# Patient Record
Sex: FEMALE | Race: WHITE | NOT HISPANIC OR LATINO | Employment: FULL TIME | URBAN - METROPOLITAN AREA
[De-identification: names, ages, dates, MRNs, and addresses within clinical notes are randomized per-mention and may not be internally consistent; named-entity substitution may affect disease eponyms.]

---

## 2020-03-01 ENCOUNTER — HOSPITAL ENCOUNTER (EMERGENCY)
Facility: HOSPITAL | Age: 50
Discharge: HOME | End: 2020-03-02
Attending: EMERGENCY MEDICINE | Admitting: EMERGENCY MEDICINE
Payer: COMMERCIAL

## 2020-03-01 DIAGNOSIS — R07.9 CHEST PAIN: Primary | ICD-10-CM

## 2020-03-01 LAB
ALBUMIN SERPL BCP-MCNC: 4.8 G/DL (ref 3.5–5.7)
ALP SERPL-CCNC: 57 U/L (ref 40–150)
ALT SERPL W P-5'-P-CCNC: 19 U/L (ref 7–52)
ANION GAP SERPL CALCULATED.3IONS-SCNC: 6 MMOL/L (ref 4–13)
APTT PPP: 40 SECONDS (ref 23–37)
AST SERPL W P-5'-P-CCNC: 16 U/L (ref 13–39)
BASOPHILS # BLD AUTO: 0.1 THOUSANDS/ΜL (ref 0–0.1)
BASOPHILS NFR BLD AUTO: 1 % (ref 0–2)
BILIRUB SERPL-MCNC: 0.3 MG/DL (ref 0.2–1)
BUN SERPL-MCNC: 19 MG/DL (ref 7–25)
CALCIUM SERPL-MCNC: 9.4 MG/DL (ref 8.6–10.5)
CHLORIDE SERPL-SCNC: 104 MMOL/L (ref 98–107)
CO2 SERPL-SCNC: 29 MMOL/L (ref 21–31)
CREAT SERPL-MCNC: 0.96 MG/DL (ref 0.6–1.2)
D DIMER PPP FEU-MCNC: <0.15 UG/ML FEU
EOSINOPHIL # BLD AUTO: 0.2 THOUSAND/ΜL (ref 0–0.61)
EOSINOPHIL NFR BLD AUTO: 3 % (ref 0–5)
ERYTHROCYTE [DISTWIDTH] IN BLOOD BY AUTOMATED COUNT: 12.9 % (ref 11.5–14.5)
GFR SERPL CREATININE-BSD FRML MDRD: 69 ML/MIN/1.73SQ M
GLUCOSE SERPL-MCNC: 99 MG/DL (ref 65–99)
HCT VFR BLD AUTO: 40.8 % (ref 42–47)
HGB BLD-MCNC: 13.8 G/DL (ref 12–16)
INR PPP: 1.1 (ref 0.9–1.5)
LYMPHOCYTES # BLD AUTO: 2.6 THOUSANDS/ΜL (ref 0.6–4.47)
LYMPHOCYTES NFR BLD AUTO: 40 % (ref 21–51)
MCH RBC QN AUTO: 30.6 PG (ref 26–34)
MCHC RBC AUTO-ENTMCNC: 33.8 G/DL (ref 31–37)
MCV RBC AUTO: 91 FL (ref 81–99)
MONOCYTES # BLD AUTO: 0.6 THOUSAND/ΜL (ref 0.17–1.22)
MONOCYTES NFR BLD AUTO: 9 % (ref 2–12)
NEUTROPHILS # BLD AUTO: 3 THOUSANDS/ΜL (ref 1.4–6.5)
NEUTS SEG NFR BLD AUTO: 47 % (ref 42–75)
PLATELET # BLD AUTO: 239 THOUSANDS/UL (ref 149–390)
PMV BLD AUTO: 8 FL (ref 8.6–11.7)
POTASSIUM SERPL-SCNC: 3.6 MMOL/L (ref 3.5–5.5)
PROT SERPL-MCNC: 6.9 G/DL (ref 6.4–8.9)
PROTHROMBIN TIME: 12.7 SECONDS (ref 10.2–13)
RBC # BLD AUTO: 4.5 MILLION/UL (ref 3.9–5.2)
SODIUM SERPL-SCNC: 139 MMOL/L (ref 134–143)
TROPONIN I SERPL-MCNC: <0.03 NG/ML
WBC # BLD AUTO: 6.4 THOUSAND/UL (ref 4.8–10.8)

## 2020-03-01 PROCEDURE — 93005 ELECTROCARDIOGRAM TRACING: CPT

## 2020-03-01 PROCEDURE — 99284 EMERGENCY DEPT VISIT MOD MDM: CPT | Performed by: EMERGENCY MEDICINE

## 2020-03-01 PROCEDURE — 85025 COMPLETE CBC W/AUTO DIFF WBC: CPT | Performed by: EMERGENCY MEDICINE

## 2020-03-01 PROCEDURE — 99285 EMERGENCY DEPT VISIT HI MDM: CPT

## 2020-03-01 PROCEDURE — 80053 COMPREHEN METABOLIC PANEL: CPT | Performed by: EMERGENCY MEDICINE

## 2020-03-01 PROCEDURE — 36415 COLL VENOUS BLD VENIPUNCTURE: CPT | Performed by: EMERGENCY MEDICINE

## 2020-03-01 PROCEDURE — 85610 PROTHROMBIN TIME: CPT | Performed by: EMERGENCY MEDICINE

## 2020-03-01 PROCEDURE — 85730 THROMBOPLASTIN TIME PARTIAL: CPT | Performed by: EMERGENCY MEDICINE

## 2020-03-01 PROCEDURE — 84484 ASSAY OF TROPONIN QUANT: CPT | Performed by: EMERGENCY MEDICINE

## 2020-03-01 PROCEDURE — 85379 FIBRIN DEGRADATION QUANT: CPT | Performed by: EMERGENCY MEDICINE

## 2020-03-02 VITALS
DIASTOLIC BLOOD PRESSURE: 69 MMHG | SYSTOLIC BLOOD PRESSURE: 119 MMHG | WEIGHT: 135 LBS | HEIGHT: 64 IN | TEMPERATURE: 97.8 F | OXYGEN SATURATION: 99 % | RESPIRATION RATE: 16 BRPM | BODY MASS INDEX: 23.05 KG/M2 | HEART RATE: 59 BPM

## 2020-03-02 LAB — TROPONIN I SERPL-MCNC: <0.03 NG/ML

## 2020-03-03 LAB
ATRIAL RATE: 59 BPM
ATRIAL RATE: 63 BPM
P AXIS: 25 DEGREES
P AXIS: 65 DEGREES
PR INTERVAL: 170 MS
PR INTERVAL: 172 MS
QRS AXIS: 75 DEGREES
QRS AXIS: 80 DEGREES
QRSD INTERVAL: 78 MS
QRSD INTERVAL: 82 MS
QT INTERVAL: 378 MS
QT INTERVAL: 394 MS
QTC INTERVAL: 386 MS
QTC INTERVAL: 390 MS
T WAVE AXIS: 54 DEGREES
T WAVE AXIS: 71 DEGREES
VENTRICULAR RATE: 59 BPM
VENTRICULAR RATE: 63 BPM

## 2020-03-03 PROCEDURE — 93010 ELECTROCARDIOGRAM REPORT: CPT | Performed by: INTERNAL MEDICINE

## 2020-03-03 NOTE — ED PROVIDER NOTES
History  Chief Complaint   Patient presents with    Chest Pain     chest pain radiating into back for 3 days  States worse with movement  Pain is constant, describes as heavy  No shortness of breath  HPI     Patient is a pleasant 44year old female who presents with constant substernal non-radiating chest pain for the past 3 days  No radiation to the back  No tearing pain going to the back  Normal bilat UE pulses  No pleuritic pain  No history of PE  No new unilateral leg swelling  Non exertional  No sweats  No right sided pain  No vomiting  No fever or cough to suggest pneumonia  No  vomiting or subcue crepitus  Equal breath sounds  No skin rash  Patient appears well  Patient is low-risk for ACS by the HEART score and has approx 1 7-2 5% risk of MACE in the next 30d given the negative EKG and initial cardiac troponin  I discussed with the patient the option of obtaining a repeat cardiac troponin and EKG at the t+3 hr time point  Patient was agreeable to repeat troponin/ekg at t+3 hr meredith  If serial troponin is negative patient understands the need to follow up w PCP as soon as possible for further evaluation for additional investigation; ED return precautions regarding chest pain symptoms concerning for ACS were discussed (e g , dyspnea, exertional pain, radiation of pain to shoulders, diaphoresis, vomiting)  Patient expressed understanding and agreed to plan  MDM well appearing 44year old female chest pain, will delta trop, followup for stress test      Ddimer negative, not a pe  Discussed with the patient who agrees with the workup and plan, understands return precautions and followup instructions  None       History reviewed  No pertinent past medical history  History reviewed  No pertinent surgical history  History reviewed  No pertinent family history  I have reviewed and agree with the history as documented      E-Cigarette/Vaping    E-Cigarette Use Never User      E-Cigarette/Vaping Substances     Social History     Tobacco Use    Smoking status: Never Smoker    Smokeless tobacco: Never Used   Substance Use Topics    Alcohol use: Yes     Frequency: Monthly or less    Drug use: Never       Review of Systems   Cardiovascular: Positive for chest pain  All other systems reviewed and are negative  Physical Exam  Physical Exam   Constitutional: She is oriented to person, place, and time  She appears well-developed and well-nourished  HENT:   Head: Normocephalic and atraumatic  Right Ear: External ear normal    Left Ear: External ear normal    Eyes: Conjunctivae and EOM are normal    Neck: Normal range of motion  Neck supple  No JVD present  No tracheal deviation present  Cardiovascular: Normal rate, regular rhythm and normal heart sounds  Pulmonary/Chest: Effort normal  No respiratory distress  She has no wheezes  She has no rales  Abdominal: Soft  Bowel sounds are normal  There is no tenderness  There is no rebound and no guarding  Musculoskeletal: She exhibits no edema or tenderness  Neurological: She is alert and oriented to person, place, and time  Skin: Skin is warm and dry  No rash noted  No erythema  Psychiatric: She has a normal mood and affect  Thought content normal    Nursing note and vitals reviewed        Vital Signs  ED Triage Vitals   Temperature Pulse Respirations Blood Pressure SpO2   03/01/20 2049 03/01/20 2047 03/01/20 2100 03/01/20 2047 03/01/20 2047   97 8 °F (36 6 °C) 70 19 169/92 100 %      Temp src Heart Rate Source Patient Position - Orthostatic VS BP Location FiO2 (%)   -- 03/01/20 2100 -- -- --    Monitor         Pain Score       03/01/20 2047       3           Vitals:    03/02/20 0015 03/02/20 0030 03/02/20 0045 03/02/20 0100   BP:  123/74  119/69   Pulse: 59 56 64 59         Visual Acuity      ED Medications  Medications - No data to display    Diagnostic Studies  Results Reviewed     Procedure Component Value Units Date/Time    Troponin I [[id number]]  (Normal) Collected:  03/01/20 2356    Lab Status:  Final result Specimen:  Blood from Arm, Right Updated:  03/02/20 0022     Troponin I <0 03 ng/mL     Troponin I [[id number]]  (Normal) Collected:  03/01/20 2056    Lab Status:  Final result Specimen:  Blood from Arm, Right Updated:  03/01/20 2125     Troponin I <0 03 ng/mL     Comprehensive metabolic panel [[id number]] Collected:  03/01/20 2056    Lab Status:  Final result Specimen:  Blood from Arm, Right Updated:  03/01/20 2125     Sodium 139 mmol/L      Potassium 3 6 mmol/L      Chloride 104 mmol/L      CO2 29 mmol/L      ANION GAP 6 mmol/L      BUN 19 mg/dL      Creatinine 0 96 mg/dL      Glucose 99 mg/dL      Calcium 9 4 mg/dL      AST 16 U/L      ALT 19 U/L      Alkaline Phosphatase 57 U/L      Total Protein 6 9 g/dL      Albumin 4 8 g/dL      Total Bilirubin 0 30 mg/dL      eGFR 69 ml/min/1 73sq m     Narrative:       [facility] guidelines for Chronic Kidney Disease (CKD):     Stage 1 with normal or high GFR (GFR > 90 mL/min/1 73 square meters)    Stage 2 Mild CKD (GFR = 60-89 mL/min/1 73 square meters)    Stage 3A Moderate CKD (GFR = 45-59 mL/min/1 73 square meters)    Stage 3B Moderate CKD (GFR = 30-44 mL/min/1 73 square meters)    Stage 4 Severe CKD (GFR = 15-29 mL/min/1 73 square meters)    Stage 5 End Stage CKD (GFR <15 mL/min/1 73 square meters)  Note: GFR calculation is accurate only with a steady state creatinine    Protime-INR [[id number]]  (Normal) Collected:  03/01/20 2056    Lab Status:  Final result Specimen:  Blood from Arm, Right Updated:  03/01/20 2125     Protime 12 7 seconds      INR 1 10    APTT [[id number]]  (Abnormal) Collected:  03/01/20 2056    Lab Status:  Final result Specimen:  Blood from Arm, Right Updated:  03/01/20 2125     PTT 40 seconds     D-Dimer [[id number]]  (Normal) Collected:  03/01/20 2056    Lab Status:  Final result Specimen:  Blood from Arm, Right Updated: 03/01/20 2125     D-Dimer, Quant <0 15 ug/ml FEU     CBC and differential [[id number]]  (Abnormal) Collected:  03/01/20 2056    Lab Status:  Final result Specimen:  Blood from Arm, Right Updated:  03/01/20 2115     WBC 6 40 Thousand/uL      RBC 4 50 Million/uL      Hemoglobin 13 8 g/dL      Hematocrit 40 8 %      MCV 91 fL      MCH 30 6 pg      MCHC 33 8 g/dL      RDW 12 9 %      MPV 8 0 fL      Platelets 286 Thousands/uL      Neutrophils Relative 47 %      Lymphocytes Relative 40 %      Monocytes Relative 9 %      Eosinophils Relative 3 %      Basophils Relative 1 %      Neutrophils Absolute 3 00 Thousands/µL      Lymphocytes Absolute 2 60 Thousands/µL      Monocytes Absolute 0 60 Thousand/µL      Eosinophils Absolute 0 20 Thousand/µL      Basophils Absolute 0 10 Thousands/µL                  No orders to display              Procedures  Procedures         ED Course         HEART Risk Score      Most Recent Value   Heart Score Risk Calculator   History  0 Filed at: 03/02/2020 0037   ECG  0 Filed at: 03/02/2020 0037   Age  1 Filed at: 03/02/2020 0037   Risk Factors  1 Filed at: 03/02/2020 0037   Troponin  0 Filed at: 03/02/2020 0037   HEART Score  2 Filed at: 03/02/2020 4212                            MDM      Disposition  Final diagnoses:   Chest pain     Time reflects when diagnosis was documented in both MDM as applicable and the Disposition within this note     Time User Action Codes Description Comment    3/2/2020 12:36 AM Pat Harden, [address] [R07 9] Chest pain       ED Disposition     ED Disposition Condition Date/Time Comment    Discharge Stable Mon Mar 2, 2020 12:36 AM Pax Renetta discharge to home/self care  Follow-up Information     Follow up With Specialties Details Why Contact Info    Primary Care Doctor  Schedule an appointment as soon as possible for a visit in 1 day            There are no discharge medications for this patient      Outpatient Discharge Orders   Echo stress test w contrast if indicated   Standing Status: Future Standing Exp   Date: 03/02/24       PDMP Review     None          ED Provider  Electronically Signed by           Marielos Hendrickson MD  03/03/20 2229

## 2020-12-24 DIAGNOSIS — Z20.822 EXPOSURE TO COVID-19 VIRUS: Primary | ICD-10-CM

## 2020-12-24 DIAGNOSIS — Z20.822 EXPOSURE TO COVID-19 VIRUS: ICD-10-CM

## 2020-12-24 PROCEDURE — U0003 INFECTIOUS AGENT DETECTION BY NUCLEIC ACID (DNA OR RNA); SEVERE ACUTE RESPIRATORY SYNDROME CORONAVIRUS 2 (SARS-COV-2) (CORONAVIRUS DISEASE [COVID-19]), AMPLIFIED PROBE TECHNIQUE, MAKING USE OF HIGH THROUGHPUT TECHNOLOGIES AS DESCRIBED BY CMS-2020-01-R: HCPCS | Performed by: FAMILY MEDICINE

## 2020-12-27 LAB — SARS-COV-2 RNA SPEC QL NAA+PROBE: DETECTED

## 2020-12-29 ENCOUNTER — TELEMEDICINE (OUTPATIENT)
Dept: FAMILY MEDICINE CLINIC | Facility: CLINIC | Age: 50
End: 2020-12-29
Payer: COMMERCIAL

## 2020-12-29 VITALS — WEIGHT: 151 LBS | HEIGHT: 64 IN | TEMPERATURE: 98.4 F | BODY MASS INDEX: 25.78 KG/M2

## 2020-12-29 DIAGNOSIS — U07.1 COVID-19 VIRUS INFECTION: Primary | ICD-10-CM

## 2020-12-29 PROCEDURE — 1036F TOBACCO NON-USER: CPT | Performed by: FAMILY MEDICINE

## 2020-12-29 PROCEDURE — 3725F SCREEN DEPRESSION PERFORMED: CPT | Performed by: FAMILY MEDICINE

## 2020-12-29 PROCEDURE — 99213 OFFICE O/P EST LOW 20 MIN: CPT | Performed by: FAMILY MEDICINE

## 2020-12-30 ENCOUNTER — TELEMEDICINE (OUTPATIENT)
Dept: FAMILY MEDICINE CLINIC | Facility: CLINIC | Age: 50
End: 2020-12-30
Payer: COMMERCIAL

## 2020-12-30 VITALS — HEIGHT: 64 IN | TEMPERATURE: 97.7 F | WEIGHT: 150.4 LBS | BODY MASS INDEX: 25.68 KG/M2

## 2020-12-30 DIAGNOSIS — U07.1 COVID-19 VIRUS INFECTION: Primary | ICD-10-CM

## 2020-12-30 PROCEDURE — 99213 OFFICE O/P EST LOW 20 MIN: CPT | Performed by: FAMILY MEDICINE

## 2020-12-31 ENCOUNTER — TELEMEDICINE (OUTPATIENT)
Dept: FAMILY MEDICINE CLINIC | Facility: CLINIC | Age: 50
End: 2020-12-31
Payer: COMMERCIAL

## 2020-12-31 VITALS — HEIGHT: 64 IN | BODY MASS INDEX: 25.47 KG/M2 | TEMPERATURE: 98.4 F | WEIGHT: 149.2 LBS

## 2020-12-31 DIAGNOSIS — U07.1 COVID-19 VIRUS INFECTION: Primary | ICD-10-CM

## 2020-12-31 PROCEDURE — 3008F BODY MASS INDEX DOCD: CPT | Performed by: FAMILY MEDICINE

## 2020-12-31 PROCEDURE — 99213 OFFICE O/P EST LOW 20 MIN: CPT | Performed by: FAMILY MEDICINE

## 2021-01-04 ENCOUNTER — TELEMEDICINE (OUTPATIENT)
Dept: FAMILY MEDICINE CLINIC | Facility: CLINIC | Age: 51
End: 2021-01-04
Payer: COMMERCIAL

## 2021-01-04 VITALS — WEIGHT: 149 LBS | HEIGHT: 64 IN | BODY MASS INDEX: 25.44 KG/M2 | TEMPERATURE: 97.8 F

## 2021-01-04 DIAGNOSIS — U07.1 COVID-19 VIRUS INFECTION: Primary | ICD-10-CM

## 2021-01-04 PROCEDURE — 1036F TOBACCO NON-USER: CPT | Performed by: FAMILY MEDICINE

## 2021-01-04 PROCEDURE — 3008F BODY MASS INDEX DOCD: CPT | Performed by: FAMILY MEDICINE

## 2021-01-04 PROCEDURE — 99213 OFFICE O/P EST LOW 20 MIN: CPT | Performed by: FAMILY MEDICINE

## 2021-01-04 NOTE — LETTER
Dear Dr Pollo Burgos is interested in participating in the [facility] COVID-19 convalescent plasma collection program  Please see my attached note verifying eligibility  Sincerely,      Luca DO Cele    [facility] 44 y o  female   MRN: [id number]  1970    COVID-19 Convalescent Plasma Donor Attestation Verification of Eligibility     [facility] is a 44 y o  female who was diagnosed with COVID-19 infection, has recovered from the illness and wishes to participate in the [facility] convalescent plasma donation program to treat critically ill COVID-19 patients  Donor understands that they will be screened by [address] and if deemed a suitable candidate, donation appointment time will be arranged directly through [address]  They were also informed that their plasma will be in a donor pool and cannot be directed to a specific patient  Patient tested positive for 2019 Novel Coronavirus 2019 (SARS-CoV-2 RNA) on 12/24/2020  Patient is now recovered from COVID-19 and has been or will be symptom free for at least 28 days, effective 02/01/2021  To facilitate donation, eligibility form sent directly to [address]  Patients preferred contact number: [phone]    All of the patient's questions were answered via telephone  She is aware to call our office with any further questions or concerns as needed

## 2021-01-04 NOTE — PROGRESS NOTES
COVID-19 Virtual Visit     Assessment/Plan:    Problem List Items Addressed This Visit        Other    COVID-19 virus infection - Primary     Today is day #10  The patient has improved  I am going to take her out of quarantine tomorrow in discharge her  I told her that she will no longer be considered infectious after 10 days in quarantine  She is also considered immune  I told her that immunity is likely short-lived  As such, she still needs to wear mask, wash her hands frequently, and practice social distancing  I told her that if she is in contact with another COVID-19 positive individual within the next 90 days, she does not need to quarantine and she does not need to be retested  I also advised her that she is still candidate to get the vaccination  I recommend she wait 90 days prior to getting the vaccine  Lastly, we discussed plasma donation  The patient was interested in plasma donation  I will submit a letter to the [facility] blood back on her behalf  I answered all of her questions  The patient was discharged  Disposition:     I recommended continued isolation until at least 24 hours have passed since recovery defined as resolution of fever without the use of fever-reducing medications and improvement in respiratory symptoms (e g , cough, shortness of breath) AND 10 days have passed since onset of symptoms  I have spent 14 minutes directly with the patient  Greater than 50% of this time was spent in counseling/coordination of care regarding: prognosis and patient and family education          Encounter provider Shasha Pablo DO    Provider located at [facility]  [address] [address]  [phone]    Recent Visits  Date Type Provider Dept   12/31/20 [address], [facility]   12/30/20 [address], DO [facility]   12/29/20 Telemedicine Beau Lord Khang, DO [facility]   Showing recent visits within past 7 days and meeting all other requirements     Today's Visits  Date Type Provider Dept   01/04/21 Telemedicine Luca Oakley, DO [facility]   Showing today's visits and meeting all other requirements     Future Appointments  No visits were found meeting these conditions  Showing future appointments within next 150 days and meeting all other requirements      This virtual check-in was done via [employer] and patient was informed that this is not a secure, HIPAA-compliant platform  She agrees to proceed  Patient agrees to participate in a virtual check in via telephone or video visit instead of presenting to the office to address urgent/immediate medical needs  Patient is aware this is a billable service  After connecting through [facility] and the [facility], the patient was identified by name and date of birth  Rodri Lancaster was informed that this was a telemedicine visit and that the exam was being conducted confidentially over secure lines  My office door was closed  No one else was in the room  Rodri Lancaster acknowledged consent and understanding of privacy and security of the telemedicine visit  I informed the patient that I have reviewed her record in Epic and presented the opportunity for her to ask any questions regarding the visit today  The patient agreed to participate  Subjective:   Rodri Lancaster is a 44 y o  female who has been screened for COVID-19  Symptom change since last report: resolving  Date of symptom onset: 12/25/2020    Patient's symptoms include nasal congestion, sore throat, anosmia, loss of taste and cough  Patient denies fever, chills, fatigue, malaise, rhinorrhea, shortness of breath, chest tightness, abdominal pain, nausea, vomiting, diarrhea, myalgias and headaches  Fabrice Smalls has been staying home and has isolated themselves in her home   She is taking care to not share personal items and is cleaning all surfaces that are touched often, like counters, tabletops, and doorknobs using household cleaning sprays or wipes  Wearing a mask when leaving room?: is not      Lab Results   Component Value Date    SARSCOV2 Detected (A) 12/24/2020     History reviewed  No pertinent past medical history  Past Surgical History:   Procedure Laterality Date    TONSILLECTOMY      TUBAL LIGATION       No current outpatient medications on file  No current facility-administered medications for this visit  No Known Allergies    Review of Systems   Constitutional: Negative for chills, fatigue and fever  HENT: Positive for congestion and sore throat  Negative for rhinorrhea  Respiratory: Positive for cough  Negative for chest tightness and shortness of breath  Gastrointestinal: Negative for abdominal pain, diarrhea, nausea and vomiting  Musculoskeletal: Negative for myalgias  Neurological: Negative for headaches  Objective:    Vitals:    01/04/21 0825   Temp: 97 8 °F (36 6 °C)   Weight: 67 6 kg (149 lb)   Height: 5' 4" (1 626 m)       Physical Exam  Vitals signs reviewed  Constitutional:       Comments: This is a pleasant 41-year-old white male her stated age  She was in no apparent distress   HENT:      Head: Normocephalic and atraumatic  Mouth/Throat:      Mouth: Mucous membranes are moist       Pharynx: Oropharynx is clear  No oropharyngeal exudate or posterior oropharyngeal erythema  Eyes:      General: No scleral icterus  Right eye: No discharge  Left eye: No discharge  Conjunctiva/sclera: Conjunctivae normal    Pulmonary:      Comments: There was no coughing  Patient was not tachypneic  She did not appear to be in any respiratory distress  Lymphadenopathy:      Cervical: No cervical adenopathy  VIRTUAL VISIT DISCLAIMER    Merlin Rosio acknowledges that she has consented to an online visit or consultation   She understands that the online visit is based solely on information provided by her, and that, in the absence of a face-to-face physical evaluation by the physician, the diagnosis she receives is both limited and provisional in terms of accuracy and completeness  This is not intended to replace a full medical face-to-face evaluation by the physician  Estefani Lerma understands and accepts these terms

## 2021-01-04 NOTE — ASSESSMENT & PLAN NOTE
Today is day #10  The patient has improved  I am going to take her out of quarantine tomorrow in discharge her  I told her that she will no longer be considered infectious after 10 days in quarantine  She is also considered immune  I told her that immunity is likely short-lived  As such, she still needs to wear mask, wash her hands frequently, and practice social distancing  I told her that if she is in contact with another COVID-19 positive individual within the next 90 days, she does not need to quarantine and she does not need to be retested  I also advised her that she is still candidate to get the vaccination  I recommend she wait 90 days prior to getting the vaccine  Lastly, we discussed plasma donation  The patient was interested in plasma donation  I will submit a letter to the [facility] blood back on her behalf  I answered all of her questions  The patient was discharged

## 2021-05-04 ENCOUNTER — VBI (OUTPATIENT)
Dept: ADMINISTRATIVE | Facility: OTHER | Age: 51
End: 2021-05-04

## 2021-06-05 ENCOUNTER — RA CDI HCC (OUTPATIENT)
Dept: OTHER | Facility: HOSPITAL | Age: 51
End: 2021-06-05

## 2021-06-05 NOTE — PROGRESS NOTES
[facility]  coding opportunities          Chart reviewed, no opportunity found: CHART REVIEWED, NO OPPORTUNITY FOUND              Patients insurance company:  [facility] (Medicare Advantage and Commercial)

## 2021-06-11 ENCOUNTER — OFFICE VISIT (OUTPATIENT)
Dept: FAMILY MEDICINE CLINIC | Facility: CLINIC | Age: 51
End: 2021-06-11
Payer: COMMERCIAL

## 2021-06-11 VITALS
BODY MASS INDEX: 25.66 KG/M2 | HEIGHT: 64 IN | WEIGHT: 150.3 LBS | HEART RATE: 61 BPM | DIASTOLIC BLOOD PRESSURE: 82 MMHG | TEMPERATURE: 97 F | SYSTOLIC BLOOD PRESSURE: 138 MMHG | OXYGEN SATURATION: 98 %

## 2021-06-11 DIAGNOSIS — Z12.11 COLON CANCER SCREENING: ICD-10-CM

## 2021-06-11 DIAGNOSIS — Z12.31 ENCOUNTER FOR SCREENING MAMMOGRAM FOR MALIGNANT NEOPLASM OF BREAST: ICD-10-CM

## 2021-06-11 DIAGNOSIS — E78.5 DYSLIPIDEMIA: ICD-10-CM

## 2021-06-11 DIAGNOSIS — Z00.00 ANNUAL PHYSICAL EXAM: Primary | ICD-10-CM

## 2021-06-11 DIAGNOSIS — E04.9 GOITER: ICD-10-CM

## 2021-06-11 PROCEDURE — 99396 PREV VISIT EST AGE 40-64: CPT | Performed by: FAMILY MEDICINE

## 2021-06-11 PROCEDURE — 3008F BODY MASS INDEX DOCD: CPT | Performed by: FAMILY MEDICINE

## 2021-06-11 PROCEDURE — 1036F TOBACCO NON-USER: CPT | Performed by: FAMILY MEDICINE

## 2021-06-11 NOTE — ASSESSMENT & PLAN NOTE
I reviewed the patient's family history  Her mother is alive and well  Her father  of unknown causes to the patient  She has 2 brothers  One brother is alive and well  One brother has cancer  She thinks his throat cancer  He apparently was a heavy smoker  She has 1 sister was alive and well  I have recommended routine annual mammogram for the patient  She was agreeable and an order was placed  I recommended colon cancer screening for the patient  We discussed a screening colonoscopy  She elected to do a Cologuard  I will make arrangements to have this sent out to her  I am recommending routine blood work  She should have a lipid panel and her blood sugar checked  Because her thyroid is a little enlarged, I am going to check a TSH as well  I will make further recommendations when I get these results  I completed her form for work  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

## 2021-06-11 NOTE — PROGRESS NOTES
Assessment/Plan:    Annual physical exam   I reviewed the patient's family history  Her mother is alive and well  Her father  of unknown causes to the patient  She has 2 brothers  One brother is alive and well  One brother has cancer  She thinks his throat cancer  He apparently was a heavy smoker  She has 1 sister was alive and well  I have recommended routine annual mammogram for the patient  She was agreeable and an order was placed  I recommended colon cancer screening for the patient  We discussed a screening colonoscopy  She elected to do a Cologuard  I will make arrangements to have this sent out to her  I am recommending routine blood work  She should have a lipid panel and her blood sugar checked  Because her thyroid is a little enlarged, I am going to check a TSH as well  I will make further recommendations when I get these results  I completed her form for work  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  Diagnoses and all orders for this visit:    Annual physical exam    Encounter for screening mammogram for malignant neoplasm of breast  -     Mammo screening bilateral w 3d & cad; Future    Colon cancer screening  -     Cologuard; Future    Goiter  -     TSH, 3rd generation with Free T4 reflex; Future    Dyslipidemia  -     Lipid panel; Future  -     Comprehensive metabolic panel; Future        BMI Counseling: Body mass index is 25 8 kg/m²  The BMI is above normal  Nutrition recommendations include decreasing overall calorie intake  Exercise recommendations include exercising 3-5 times per week  Subjective:      Patient ID: German Sorensen is a 46 y o  female  This patient is a 47-year-old white female who presents to the office today because she needed a work physical done  She has not been seen in this office for years    I did treat this patient for COVID- 19 infection back in December  She had told me at that time that she had not seen a doctor in years  She has never had a mammogram   She tells me she had a colonoscopy at least 20 years ago  Apparently, she was having some type of symptoms at that time which prompted the colonoscopy  Has not had any routine  Colon cancer screening  Did see a gynecologist about 3 years ago  She tells me she feels well and think she is doing well for her age  The following portions of the patient's history were reviewed and updated as appropriate: allergies, current medications, past family history, past medical history, past social history, past surgical history and problem list     Review of Systems   Constitutional: Negative for activity change, appetite change and unexpected weight change  Respiratory: Negative for cough, shortness of breath and wheezing  Cardiovascular: Negative for chest pain, palpitations and leg swelling  Gastrointestinal: Negative for abdominal distention, abdominal pain, blood in stool, constipation and diarrhea  Objective:      /82 (BP Location: Left arm, Patient Position: Sitting, Cuff Size: Adult)   Pulse 61   Temp (!) 97 °F (36 1 °C) (Temporal)   Ht 5' 4" (1 626 m)   Wt 68 2 kg (150 lb 4 8 oz)   SpO2 98%   BMI 25 80 kg/m²          Physical Exam  Vitals signs reviewed  Constitutional:       Comments: This is a 58-year-old white female who appears her stated age  She is in no apparent distress   HENT:      Head: Normocephalic and atraumatic  Right Ear: Tympanic membrane, ear canal and external ear normal  There is no impacted cerumen  Left Ear: Tympanic membrane, ear canal and external ear normal  There is no impacted cerumen  Mouth/Throat:      Mouth: Mucous membranes are moist       Pharynx: Oropharynx is clear  No oropharyngeal exudate or posterior oropharyngeal erythema  Eyes:      General: No scleral icterus  Right eye: No discharge  Left eye: No discharge  Conjunctiva/sclera: Conjunctivae normal       Pupils: Pupils are equal, round, and reactive to light  Neck:      Musculoskeletal: Neck supple  Comments: There was mild symmetrical enlargement of the thyroid gland  There were no nodules palpable  The thyroid was nontender  Cardiovascular:      Rate and Rhythm: Normal rate and regular rhythm  Heart sounds: Normal heart sounds  No murmur  No friction rub  No gallop  Pulmonary:      Effort: Pulmonary effort is normal  No respiratory distress  Breath sounds: Normal breath sounds  No stridor  No wheezing, rhonchi or rales  Abdominal:      General: Bowel sounds are normal  There is no distension  Palpations: Abdomen is soft  There is no mass  Tenderness: There is no abdominal tenderness  There is no guarding  Hernia: No hernia is present  Comments: There is no organomegaly   Lymphadenopathy:      Cervical: No cervical adenopathy  Psychiatric:         Mood and Affect: Mood normal          Behavior: Behavior normal          Thought Content:  Thought content normal          Judgment: Judgment normal        Extremities:  Without cyanosis, clubbing, or edema

## 2021-06-11 NOTE — PATIENT INSTRUCTIONS
Screening for Colorectal Cancer   AMBULATORY CARE:   Screening for colorectal cancer  can find problems early and treatment can begin  Colorectal cancer is the third leading cause of death in the [place] and [place] ([facility])  Early treatment can save your life  It is recommended that you be screened between ages 48and 76years old  Your healthcare provider will tell you if you should be screened before 48 or after 76years of age  Types of available screening: Your healthcare provider will talk to you about the benefits and risks of each type of screening  He or she will work with you to decide which screening is best for you  He or she will also tell you when to start getting screened  · Fecal occult blood testing (FOBT)  can be collected at home  FOBT checks for blood in your bowel movements  You will need a small sample of 2 to 3 bowel movements  Your healthcare provider will give you special cards to put the sample on  You will return the cards to your healthcare provider's office or a lab for the test to be done  If your test is positive for blood, you will need to have a colonoscopy  A flexible sigmoidoscopy or colonoscopy may help find where the blood is coming from  The blood may mean you have polyps, cancer, or other conditions, such as hemorrhoids  You may have to avoid certain medicines and food for about 3 days before you collect the sample  Your healthcare provider will tell what medicines and foods to avoid  If blood is not found, you may need to do the test next year  · A fecal immunochemical test (FIT)  is also collected at home and then sent to a lab for testing  FIT also tests for blood in your bowel movements  You do not have to avoid any medicines or foods for FIT  You collect a sample from 1 bowel movement and put it in the special container  The container is either mailed or taken to your healthcare provider's office or lab   If blood is found, you may need a flexible sigmoidoscopy or colonoscopy  If blood is not found, you may need to do the test next year  · A sigmoidoscopy  is a procedure to look inside your rectum and sigmoid colon  The sigmoid colon is the lower part of your intestines, closest to your rectum  A sigmoidoscope will be inserted into your rectum  This is a tube with a light and tiny camera on the end  Pictures of your colon appear on a monitor during the procedure  A flexible sigmoidoscopy may help diagnose colon cancer, inflammation, polyps (growths), or infections  · A colonoscopy  is a procedure to examine the inside of your colon (intestine) with a scope  A scope is a flexible tube with a small light and camera on the end  Polyps or tissue growths may be removed and tested for cancer during your colonoscopy  · A virtual colonoscopy  is a type of x-ray test to examine the inside of your colon (large intestine)  Healthcare providers use a CT scan or MRI to take pictures of your colon from outside your body  You will need to drink contrast liquid to make the pictures show up better  This procedure is used to check for polyps (growths) or cancer  The size of a polyp may also be monitored  You may need this procedure to check if colorectal cancer has come back after you had treatment  A virtual colonoscopy may be used if you are not able to have a regular colonoscopy  · The DNA in your bowel movement may be checked for genetic changes  Genetic changes may be a sign of colorectal cancer  How often you may need colorectal cancer screening: Your healthcare provider will tell you how often to get screened  Timing depends on the type of screening and if polyps or other problems were found  Timing also depends on your age and if you are at increased risk for cancer  You may need screening every 1, 2, 5, or 10 years  Risks of colorectal cancer screening: There are always risks with any type of screening   Talk with your healthcare provider about the risks of screening  He or she may tell you the following:  · A false-negative result can happen  The result can delay treatment because it shows no cancer was found  It may cause you to not seek treatment even when you have symptoms  · A false-positive result can happen  This result can cause you to have more tests  It can also cause you to feel anxious if you think you have cancer  © Copyright [address] Information is for End User's use only and may not be sold, redistributed or otherwise used for commercial purposes  All illustrations and images included in CareNotes® are the copyrighted property of [employer]  or [address] [place], [place] [address]samantha   The above information is an  only  It is not intended as medical advice for individual conditions or treatments  Talk to your doctor, nurse or pharmacist before following any medical regimen to see if it is safe and effective for you

## 2021-06-14 ENCOUNTER — TELEPHONE (OUTPATIENT)
Dept: ADMINISTRATIVE | Facility: OTHER | Age: 51
End: 2021-06-14

## 2021-06-14 ENCOUNTER — CLINICAL SUPPORT (OUTPATIENT)
Dept: FAMILY MEDICINE CLINIC | Facility: CLINIC | Age: 51
End: 2021-06-14
Payer: COMMERCIAL

## 2021-06-14 DIAGNOSIS — Z23 IMMUNIZATION DUE: Primary | ICD-10-CM

## 2021-06-14 PROCEDURE — 86580 TB INTRADERMAL TEST: CPT

## 2021-06-14 NOTE — TELEPHONE ENCOUNTER
----- Message from Sofia Benitez MA sent at 6/11/2021  1:20 PM EDT -----  Regarding: cervicqal screening [facility]  06/11/21 1:20 PM    Hello, our patient Reema Pimentel has had Pap Smear (HPV) aka Cervical Cancer Screening completed/performed  Please assist in updating the patient chart by making an External outreach to  Neal Rivera MD facility located in [facility]  The date of service is 2019      Thank you,  Sofia Benitez MA  [facility] PRIMARY [facility]ARE

## 2021-06-14 NOTE — LETTER
Procedure Request Form: Cervical Cancer Screening      Date Requested: 21  Patient: Yan Steiner  Patient : 1970   Referring Provider: Nolan Myrick, DO        Date of Procedure ______________________________       The above patient has informed us that they have completed their   most recent Cervical Cancer Screening at your facility  Please complete   this form and attach all corresponding procedure reports/results  Comments __________________________________________________________  ____________________________________________________________________  ____________________________________________________________________  ____________________________________________________________________    Facility Completing Procedure _________________________________________    Form Completed By (print name) _______________________________________      Signature __________________________________________________________      These reports are needed for  compliance    Please fax this completed form and a copy of the procedure report to our office located at [place] [address] as soon as possible to [phone] attention [place]agger: Phone [phone]    We thank you for your assistance in treating our mutual patient

## 2021-06-16 ENCOUNTER — TELEPHONE (OUTPATIENT)
Dept: ADMINISTRATIVE | Facility: OTHER | Age: 51
End: 2021-06-16

## 2021-06-16 LAB
INDURATION: 48 MM
TB SKIN TEST: NEGATIVE

## 2021-06-16 NOTE — TELEPHONE ENCOUNTER
----- Message from Eugenio Francis MA sent at 6/11/2021  1:22 PM EDT -----  Regarding: colon screening [facility]   06/11/21 1:23 PM    Hello, our patient Leticia Hidalgo has had CRC: Colonoscopy completed/performed  Please assist in updating the patient chart by making an External outreach to [address] [place] [address] located in [facility]   The date of service is about 10 years        Thank you,  Eugenio Francis MA  [facility]

## 2021-06-16 NOTE — LETTER
Procedure Request Form: Colonoscopy      Date Requested: 21  Patient: Kelly Lank  Patient : 1970   Referring Provider: Janna Ma, DO        Date of Procedure ______________________________       The above patient has informed us that they have completed their   most recent Colonoscopy at your facility  Please complete   this form and attach all corresponding procedure reports/results  Comments __________________________________________________________  ____________________________________________________________________  ____________________________________________________________________  ____________________________________________________________________    Facility Completing Procedure _________________________________________    Form Completed By (print name) _______________________________________      Signature __________________________________________________________      These reports are needed for  compliance    Please fax this completed form and a copy of the procedure report to our office located at [place] [address] as soon as possible to [phone] attention Marilyn: Phone [phone]    We thank you for your assistance in treating our mutual patient

## 2021-06-16 NOTE — TELEPHONE ENCOUNTER
Upon review of the In Basket request and the patient's chart, initial outreach has been made via fax, please see Contacts section for details       Thank you  Jyoti Fitzgerald MA

## 2021-06-16 NOTE — LETTER
Procedure Request Form: Colonoscopy      Date Requested: 21  Patient: Karrie Sleet  Patient : 1970   Referring Provider: Huang Quirozlet, DO        Date of Procedure ______________________________       The above patient has informed us that they have completed their   most recent Colonoscopy at your facility  Please complete   this form and attach all corresponding procedure reports/results  Comments __________________________________________________________  ____________________________________________________________________  ____________________________________________________________________  ____________________________________________________________________    Facility Completing Procedure _________________________________________    Form Completed By (print name) _______________________________________      Signature __________________________________________________________      These reports are needed for  compliance    Please fax this completed form and a copy of the procedure report to our office located at [place] [address] as soon as possible to [phone] attention Marilyn: Phone [phone]    We thank you for your assistance in treating our mutual patient

## 2021-06-21 NOTE — TELEPHONE ENCOUNTER
As a follow-up, a second attempt has been made for outreach via fax, please see Contacts section for details      Thank you  Dank Carbajal MA

## 2021-06-24 NOTE — TELEPHONE ENCOUNTER
As a follow-up, a second attempt has been made for outreach via fax, please see Contacts section for details      Thank you  Celi Marquez MA

## 2021-07-07 NOTE — TELEPHONE ENCOUNTER
As a final attempt, a third outreach has been made via telephone call  Please see Contacts section for details  This encounter will be closed and completed by end of day  Should we receive the requested information because of previous outreach attempts, the requested patient's chart will be updated appropriately       Thank you  Gadiel Sims MA

## 2021-07-13 NOTE — TELEPHONE ENCOUNTER
Upon review of the In Basket request we contacted the Dr 's office and patient has not been seen since 2015 - for uterine bleeding no pap    Any additional questions or concerns should be emailed to the Practice Liaisons via [email or web address] email, please do not reply via In Basket      Thank you  Germania Valiente MA

## 2022-01-08 ENCOUNTER — OFFICE VISIT (OUTPATIENT)
Dept: URGENT CARE | Facility: CLINIC | Age: 52
End: 2022-01-08
Payer: COMMERCIAL

## 2022-01-08 VITALS — TEMPERATURE: 97.9 F | RESPIRATION RATE: 18 BRPM | HEART RATE: 78 BPM | OXYGEN SATURATION: 100 %

## 2022-01-08 DIAGNOSIS — H00.024 HORDEOLUM INTERNUM OF LEFT UPPER EYELID: Primary | ICD-10-CM

## 2022-01-08 PROCEDURE — 99213 OFFICE O/P EST LOW 20 MIN: CPT | Performed by: PHYSICIAN ASSISTANT

## 2022-01-08 RX ORDER — TOBRAMYCIN 3 MG/ML
2 SOLUTION/ DROPS OPHTHALMIC 4 TIMES DAILY
Qty: 5 ML | Refills: 0 | Status: SHIPPED | OUTPATIENT
Start: 2022-01-08 | End: 2022-01-13

## 2022-01-08 NOTE — PATIENT INSTRUCTIONS
Stye   WHAT YOU NEED TO KNOW:   A stye is a lump on the edge or inside of your eyelid caused by an infection  A stye can form on your upper or lower eyelid  It usually goes away in 2 to 4 days  DISCHARGE INSTRUCTIONS:   Medicines:   · Antibiotic medicine: This is given as an ointment to put into your eye  It is used to fight an infection caused by bacteria  Use as directed  · Take your medicine as directed  Contact your healthcare provider if you think your medicine is not helping or if you have side effects  Tell him of her if you are allergic to any medicine  Keep a list of the medicines, vitamins, and herbs you take  Include the amounts, and when and why you take them  Bring the list or the pill bottles to follow-up visits  Carry your medicine list with you in case of an emergency  Follow up with your doctor as directed:  Write down your questions so you remember to ask them during your visits  Self-care:   · Use warm compresses: This will help decrease swelling and pain  Wet a clean washcloth with warm water and place it on your eye for 10 to 15 minutes, 3 to 4 times each day or as directed  · Keep your hands away from your eye: This helps to prevent the spread of the infection to other parts of the eye  Wash your hands often with soap and dry with a clean towel  Do not squeeze the stye  · Do not use eye makeup:  Do not wear eye makeup while you have a stye  Eye makeup may carry bacteria and cause another stye  Throw away eye makeup and brushes used to apply the makeup  Use new eye makeup after the stye has gone away  Do not share eye makeup with others  · Prevent another stye:  Wash your face and clean your eyelashes every day  Remove eye makeup with makeup remover  This helps to completely remove eye makeup without heavy rubbing  Contact your healthcare provider if:   · You have redness and discharge around your eye, and your eye pain is getting worse      · Your vision changes  · The stye has not gone away within 7 days  · The stye comes back within a short period of time after treatment  · You have questions or concerns about your condition or care  © Copyright [employer] 2021 Information is for End User's use only and may not be sold, redistributed or otherwise used for commercial purposes  All illustrations and images included in CareNotes® are the copyrighted property of A D A M , Inc  or [facility] Arvin Guzman   The above information is an  only  It is not intended as medical advice for individual conditions or treatments  Talk to your doctor, nurse or pharmacist before following any medical regimen to see if it is safe and effective for you

## 2022-01-08 NOTE — PROGRESS NOTES
[facility]    NAME: Verona Caldwell is a 46 y o  female  : 1970    MRN: [id number]  DATE: 2022  TIME: 5:24 PM    Assessment and Plan   Hordeolum internum of left upper eyelid [H00 024]  1  Hordeolum internum of left upper eyelid  tobramycin (TOBREX) 0 3 % SOLN       Patient Instructions     Patient Instructions   Stye   WHAT YOU NEED TO KNOW:   A stye is a lump on the edge or inside of your eyelid caused by an infection  A stye can form on your upper or lower eyelid  It usually goes away in 2 to 4 days  DISCHARGE INSTRUCTIONS:   Medicines:   · Antibiotic medicine: This is given as an ointment to put into your eye  It is used to fight an infection caused by bacteria  Use as directed  · Take your medicine as directed  Contact your healthcare provider if you think your medicine is not helping or if you have side effects  Tell him of her if you are allergic to any medicine  Keep a list of the medicines, vitamins, and herbs you take  Include the amounts, and when and why you take them  Bring the list or the pill bottles to follow-up visits  Carry your medicine list with you in case of an emergency  Follow up with your doctor as directed:  Write down your questions so you remember to ask them during your visits  Self-care:   · Use warm compresses: This will help decrease swelling and pain  Wet a clean washcloth with warm water and place it on your eye for 10 to 15 minutes, 3 to 4 times each day or as directed  · Keep your hands away from your eye: This helps to prevent the spread of the infection to other parts of the eye  Wash your hands often with soap and dry with a clean towel  Do not squeeze the stye  · Do not use eye makeup:  Do not wear eye makeup while you have a stye  Eye makeup may carry bacteria and cause another stye  Throw away eye makeup and brushes used to apply the makeup  Use new eye makeup after the stye has gone away   Do not share eye makeup with others  · Prevent another stye:  Wash your face and clean your eyelashes every day  Remove eye makeup with makeup remover  This helps to completely remove eye makeup without heavy rubbing  Contact your healthcare provider if:   · You have redness and discharge around your eye, and your eye pain is getting worse  · Your vision changes  · The stye has not gone away within 7 days  · The stye comes back within a short period of time after treatment  · You have questions or concerns about your condition or care  © Copyright [employer] 2021 Information is for End User's use only and may not be sold, redistributed or otherwise used for commercial purposes  All illustrations and images included in CareNotes® are the copyrighted property of A D A M , Inc  or [facility] Arvin Guzman   The above information is an  only  It is not intended as medical advice for individual conditions or treatments  Talk to your doctor, nurse or pharmacist before following any medical regimen to see if it is safe and effective for you  Chief Complaint     Chief Complaint   Patient presents with    Eyelid Pain     Left eyelid redness and swelling since yesterday  History of Present Illness   69-year-old female here with complaint of eye irritation of the left upper eyelid  Started yesterday  Stress slightly painful and itchy  Review of Systems   Review of Systems   Constitutional: Negative for appetite change, chills and fever  HENT: Negative for congestion, ear discharge, ear pain, facial swelling, postnasal drip, sinus pressure, sneezing and sore throat  Eyes: Positive for pain and redness  Respiratory: Negative for cough, shortness of breath and wheezing  Neurological: Negative for headaches  All other systems reviewed and are negative        Current Medications     Current Outpatient Medications:     tobramycin (TOBREX) 0 3 % SOLN, Administer 2 drops into the left eye 4 (four) times a day for 5 days, Disp: 5 mL, Rfl: 0    Current Allergies     Allergies as of 01/08/2022    (No Known Allergies)          The following portions of the patient's history were reviewed and updated as appropriate: allergies, current medications, past family history, past medical history, past social history, past surgical history and problem list    No past medical history on file  Past Surgical History:   Procedure Laterality Date    TONSILLECTOMY      TUBAL LIGATION       Family History   Problem Relation Age of Onset    Hypertension Mother     No Known Problems Father      Social History     Socioeconomic History    Marital status:      Spouse name: Not on file    Number of children: Not on file    Years of education: Not on file    Highest education level: Not on file   Occupational History    Not on file   Tobacco Use    Smoking status: Never Smoker    Smokeless tobacco: Never Used   Vaping Use    Vaping Use: Never used   Substance and Sexual Activity    Alcohol use: Yes    Drug use: Never    Sexual activity: Not on file   Other Topics Concern    Not on file   Social History Narrative    Not on file     Social Determinants of Health     Financial Resource Strain: Not on file   Food Insecurity: Not on file   Transportation Needs: Not on file   Physical Activity: Not on file   Stress: Not on file   Social Connections: Not on file   Intimate Partner Violence: Not on file   Housing Stability: Not on file     Medications have been verified  Objective   Pulse 78   Temp 97 9 °F (36 6 °C)   Resp 18   SpO2 100%      Physical Exam   Physical Exam  Vitals and nursing note reviewed  Constitutional:       General: She is not in acute distress  Appearance: She is well-developed  HENT:      Head: Normocephalic and atraumatic  Right Ear: Tympanic membrane normal       Left Ear: Tympanic membrane normal       Nose: Nose normal  No mucosal edema or rhinorrhea        Right Sinus: No maxillary sinus tenderness or frontal sinus tenderness  Left Sinus: No maxillary sinus tenderness or frontal sinus tenderness  Mouth/Throat:      Pharynx: No oropharyngeal exudate or posterior oropharyngeal erythema  Eyes:      General:         Left eye: Hordeolum present  Conjunctiva/sclera: Conjunctivae normal    Cardiovascular:      Rate and Rhythm: Normal rate and regular rhythm  Heart sounds: Normal heart sounds  No murmur heard

## 2022-04-20 ENCOUNTER — VBI (OUTPATIENT)
Dept: ADMINISTRATIVE | Facility: OTHER | Age: 52
End: 2022-04-20

## 2022-04-20 NOTE — TELEPHONE ENCOUNTER
04/20/22 12:03 PM     See documentation in the [facility] [facility]

## 2022-06-28 ENCOUNTER — VBI (OUTPATIENT)
Dept: ADMINISTRATIVE | Facility: OTHER | Age: 52
End: 2022-06-28

## 2022-10-04 ENCOUNTER — OFFICE VISIT (OUTPATIENT)
Dept: FAMILY MEDICINE CLINIC | Facility: CLINIC | Age: 52
End: 2022-10-04
Payer: COMMERCIAL

## 2022-10-04 VITALS
HEART RATE: 69 BPM | OXYGEN SATURATION: 98 % | WEIGHT: 137.9 LBS | TEMPERATURE: 96.1 F | DIASTOLIC BLOOD PRESSURE: 80 MMHG | SYSTOLIC BLOOD PRESSURE: 130 MMHG | HEIGHT: 64 IN | BODY MASS INDEX: 23.54 KG/M2

## 2022-10-04 DIAGNOSIS — Z11.1 ENCOUNTER FOR PPD TEST: ICD-10-CM

## 2022-10-04 DIAGNOSIS — Z02.1 ENCOUNTER FOR PRE-EMPLOYMENT EXAMINATION: Primary | ICD-10-CM

## 2022-10-04 PROCEDURE — 99213 OFFICE O/P EST LOW 20 MIN: CPT | Performed by: FAMILY MEDICINE

## 2022-10-04 PROCEDURE — 86580 TB INTRADERMAL TEST: CPT

## 2022-10-05 NOTE — ASSESSMENT & PLAN NOTE
The patient's forms were completed  There was no contraindications to her employment as a   PPD was applied to the on by the medical assistant  Patient will return to have her arm read

## 2022-10-05 NOTE — PROGRESS NOTES
Name: Panfilo Strong      : 1970      MRN: [id number]  Encounter Provider: Bakari Rey DO  Encounter Date: 10/4/2022   Encounter department: [facility] PRIMARY CARE    Assessment & Plan     1  Encounter for pre-employment examination  Assessment & Plan:  The patient's forms were completed  There was no contraindications to her employment as a   PPD was applied to the on by the medical assistant  Patient will return to have her arm read  2  Encounter for PPD test  -     TB Skin Test         Subjective      This is a 47-year-old white female who presents to the office today for a work physical   The patient is going to be working for the [employer] as a   She needed a physical as well as a PPD  She is doing well and has no complaints  Unfortunately, when she was in the office, the computer went down  I was unable to access her records for evaluation for normal health maintenance needs  Review of Systems   Constitutional: Negative for activity change and appetite change  Respiratory: Negative for cough and shortness of breath  Cardiovascular: Negative for chest pain, palpitations and leg swelling  Gastrointestinal: Negative for abdominal distention, abdominal pain, blood in stool, constipation, diarrhea and nausea  No current outpatient medications on file prior to visit  Objective     /80   Pulse 69   Temp (!) 96 1 °F (35 6 °C) (Tympanic)   Ht 5' 4" (1 626 m)   Wt 62 6 kg (137 lb 14 4 oz)   SpO2 98%   BMI 23 67 kg/m²     Physical Exam  Vitals reviewed  Constitutional:       Comments: This is a 47-year-old white female who appears her stated age  She is pleasant, cooperative, and in no distress   HENT:      Head: Normocephalic and atraumatic  Right Ear: Tympanic membrane, ear canal and external ear normal  There is no impacted cerumen        Left Ear: Tympanic membrane, ear canal and external ear normal  There is no impacted cerumen  Mouth/Throat:      Mouth: Mucous membranes are moist       Pharynx: Oropharynx is clear  No oropharyngeal exudate or posterior oropharyngeal erythema  Eyes:      General: No scleral icterus  Right eye: No discharge  Left eye: No discharge  Conjunctiva/sclera: Conjunctivae normal       Pupils: Pupils are equal, round, and reactive to light  Cardiovascular:      Rate and Rhythm: Normal rate and regular rhythm  Heart sounds: Normal heart sounds  No murmur heard  No friction rub  No gallop  Pulmonary:      Effort: Pulmonary effort is normal  No respiratory distress  Breath sounds: Normal breath sounds  No stridor  No wheezing, rhonchi or rales  Abdominal:      General: Bowel sounds are normal  There is no distension  Palpations: Abdomen is soft  There is no mass  Tenderness: There is no abdominal tenderness  There is no guarding  Musculoskeletal:      Cervical back: Neck supple  Psychiatric:         Mood and Affect: Mood normal          Behavior: Behavior normal          Thought Content: Thought content normal          Judgment: Judgment normal      Extremities:  Without cyanosis, clubbing, or edema    Fenr Orozco DO

## 2022-10-06 ENCOUNTER — CLINICAL SUPPORT (OUTPATIENT)
Dept: FAMILY MEDICINE CLINIC | Facility: CLINIC | Age: 52
End: 2022-10-06

## 2022-10-06 DIAGNOSIS — Z11.1 ENCOUNTER FOR PPD SKIN TEST READING: Primary | ICD-10-CM

## 2022-10-06 LAB
INDURATION: 0 MM
TB SKIN TEST: NEGATIVE

## 2022-10-12 PROBLEM — Z12.11 COLON CANCER SCREENING: Status: RESOLVED | Noted: 2021-06-11 | Resolved: 2022-10-12

## 2022-11-28 ENCOUNTER — RA CDI HCC (OUTPATIENT)
Dept: OTHER | Facility: HOSPITAL | Age: 52
End: 2022-11-28

## 2023-07-14 ENCOUNTER — OFFICE VISIT (OUTPATIENT)
Dept: FAMILY MEDICINE CLINIC | Facility: CLINIC | Age: 53
End: 2023-07-14
Payer: COMMERCIAL

## 2023-07-14 ENCOUNTER — APPOINTMENT (OUTPATIENT)
Dept: LAB | Facility: CLINIC | Age: 53
End: 2023-07-14
Payer: COMMERCIAL

## 2023-07-14 VITALS
SYSTOLIC BLOOD PRESSURE: 160 MMHG | HEART RATE: 60 BPM | HEIGHT: 64 IN | WEIGHT: 144.8 LBS | DIASTOLIC BLOOD PRESSURE: 75 MMHG | TEMPERATURE: 97.9 F | OXYGEN SATURATION: 99 % | BODY MASS INDEX: 24.72 KG/M2

## 2023-07-14 DIAGNOSIS — Z12.31 ENCOUNTER FOR SCREENING MAMMOGRAM FOR MALIGNANT NEOPLASM OF BREAST: ICD-10-CM

## 2023-07-14 DIAGNOSIS — R53.83 OTHER FATIGUE: ICD-10-CM

## 2023-07-14 DIAGNOSIS — Z11.59 ENCOUNTER FOR HEPATITIS C SCREENING TEST FOR LOW RISK PATIENT: ICD-10-CM

## 2023-07-14 DIAGNOSIS — R22.1 NECK MASS: ICD-10-CM

## 2023-07-14 DIAGNOSIS — E78.5 DYSLIPIDEMIA: ICD-10-CM

## 2023-07-14 DIAGNOSIS — R22.1 NECK MASS: Primary | ICD-10-CM

## 2023-07-14 LAB
ALBUMIN SERPL BCP-MCNC: 4.2 G/DL (ref 3.5–5)
ALP SERPL-CCNC: 73 U/L (ref 46–116)
ALT SERPL W P-5'-P-CCNC: 25 U/L (ref 12–78)
ANION GAP SERPL CALCULATED.3IONS-SCNC: 3 MMOL/L
AST SERPL W P-5'-P-CCNC: 17 U/L (ref 5–45)
BASOPHILS # BLD AUTO: 0.05 THOUSANDS/ÂΜL (ref 0–0.1)
BASOPHILS NFR BLD AUTO: 1 % (ref 0–1)
BILIRUB SERPL-MCNC: 0.43 MG/DL (ref 0.2–1)
BUN SERPL-MCNC: 18 MG/DL (ref 5–25)
CALCIUM SERPL-MCNC: 9.3 MG/DL (ref 8.3–10.1)
CHLORIDE SERPL-SCNC: 112 MMOL/L (ref 96–108)
CHOLEST SERPL-MCNC: 201 MG/DL
CO2 SERPL-SCNC: 26 MMOL/L (ref 21–32)
CREAT SERPL-MCNC: 1.03 MG/DL (ref 0.6–1.3)
EOSINOPHIL # BLD AUTO: 0.09 THOUSAND/ÂΜL (ref 0–0.61)
EOSINOPHIL NFR BLD AUTO: 2 % (ref 0–6)
ERYTHROCYTE [DISTWIDTH] IN BLOOD BY AUTOMATED COUNT: 12.5 % (ref 11.6–15.1)
GFR SERPL CREATININE-BSD FRML MDRD: 61 ML/MIN/1.73SQ M
GLUCOSE P FAST SERPL-MCNC: 101 MG/DL (ref 65–99)
HCT VFR BLD AUTO: 43.2 % (ref 34.8–46.1)
HDLC SERPL-MCNC: 74 MG/DL
HGB BLD-MCNC: 14.2 G/DL (ref 11.5–15.4)
IMM GRANULOCYTES # BLD AUTO: 0.01 THOUSAND/UL (ref 0–0.2)
IMM GRANULOCYTES NFR BLD AUTO: 0 % (ref 0–2)
LDLC SERPL CALC-MCNC: 110 MG/DL (ref 0–100)
LYMPHOCYTES # BLD AUTO: 1.63 THOUSANDS/ÂΜL (ref 0.6–4.47)
LYMPHOCYTES NFR BLD AUTO: 37 % (ref 14–44)
MCH RBC QN AUTO: 30.4 PG (ref 26.8–34.3)
MCHC RBC AUTO-ENTMCNC: 32.9 G/DL (ref 31.4–37.4)
MCV RBC AUTO: 93 FL (ref 82–98)
MONOCYTES # BLD AUTO: 0.42 THOUSAND/ÂΜL (ref 0.17–1.22)
MONOCYTES NFR BLD AUTO: 10 % (ref 4–12)
NEUTROPHILS # BLD AUTO: 2.18 THOUSANDS/ÂΜL (ref 1.85–7.62)
NEUTS SEG NFR BLD AUTO: 50 % (ref 43–75)
NONHDLC SERPL-MCNC: 127 MG/DL
NRBC BLD AUTO-RTO: 0 /100 WBCS
PLATELET # BLD AUTO: 260 THOUSANDS/UL (ref 149–390)
PMV BLD AUTO: 10.3 FL (ref 8.9–12.7)
POTASSIUM SERPL-SCNC: 4.8 MMOL/L (ref 3.5–5.3)
PROT SERPL-MCNC: 7.2 G/DL (ref 6.4–8.4)
RBC # BLD AUTO: 4.67 MILLION/UL (ref 3.81–5.12)
SODIUM SERPL-SCNC: 141 MMOL/L (ref 135–147)
TRIGL SERPL-MCNC: 84 MG/DL
TSH SERPL DL<=0.05 MIU/L-ACNC: 2.67 UIU/ML (ref 0.45–4.5)
WBC # BLD AUTO: 4.38 THOUSAND/UL (ref 4.31–10.16)

## 2023-07-14 PROCEDURE — 80053 COMPREHEN METABOLIC PANEL: CPT

## 2023-07-14 PROCEDURE — 86803 HEPATITIS C AB TEST: CPT

## 2023-07-14 PROCEDURE — 99213 OFFICE O/P EST LOW 20 MIN: CPT | Performed by: FAMILY MEDICINE

## 2023-07-14 PROCEDURE — 84443 ASSAY THYROID STIM HORMONE: CPT

## 2023-07-14 PROCEDURE — 80061 LIPID PANEL: CPT

## 2023-07-14 PROCEDURE — 85025 COMPLETE CBC W/AUTO DIFF WBC: CPT

## 2023-07-14 PROCEDURE — 36415 COLL VENOUS BLD VENIPUNCTURE: CPT

## 2023-07-14 NOTE — ASSESSMENT & PLAN NOTE
Patient reports a right-sided neck mass. My exam was unremarkable but given the patient's concerns, I am going to recommend a CT of the soft tissues of the neck to further evaluate her symptoms. I will make further recommendations when I get these results.

## 2023-07-14 NOTE — ASSESSMENT & PLAN NOTE
Ordered a mammogram 2 years ago which was never completed. Has healthcare insurance at this time.   She has concerns about cancer so I recommended a screening mammogram.  Patient was agreeable and mammogram was ordered

## 2023-07-14 NOTE — PROGRESS NOTES
Name: Derrick Cárdenas      : 1970      MRN: [id number]  Encounter Provider: Wolf Luna DO  Encounter Date: 2023   Encounter department: [address] [place] [address]     1. Neck mass  Assessment & Plan:  Patient reports a right-sided neck mass. My exam was unremarkable but given the patient's concerns, I am going to recommend a CT of the soft tissues of the neck to further evaluate her symptoms. I will make further recommendations when I get these results. Orders:  -     CBC and differential; Future  -     Comprehensive metabolic panel; Future  -     CT soft tissue neck w contrast; Future; Expected date: 2023    2. Encounter for screening mammogram for malignant neoplasm of breast  Assessment & Plan:  Ordered a mammogram 2 years ago which was never completed. Has healthcare insurance at this time. She has concerns about cancer so I recommended a screening mammogram.  Patient was agreeable and mammogram was ordered    Orders:  -     Mammo screening bilateral w 3d & cad; Future; Expected date: 2023    3. Dyslipidemia  -     Lipid panel; Future    4. Other fatigue  Assessment & Plan:  Routine labs were recommended for the patient. She was fasting today so I sent her next-door to get the blood work done now. I am going to recommend annual physical exam.    Orders:  -     TSH, 3rd generation with Free T4 reflex; Future    5. Encounter for hepatitis C screening test for low risk patient  -     Hepatitis C antibody; Future         Subjective      Patient is a 75-year-old white female who presents to the office today complaining that she feels a lump in the right side of her neck. First noticed this 1 week ago. She denies any pain. He denies sore throat. She denies any recent URI symptoms. Denies night sweats or weight loss. Denies any lumps or bumps elsewhere. Tells me she is concerned because her brother  from throat cancer.     Review of Systems Constitutional: Positive for fatigue. Negative for appetite change, chills, diaphoresis, fever and unexpected weight change. HENT: Negative for congestion, sore throat and trouble swallowing. Hematological: Negative for adenopathy. Does not bruise/bleed easily. No current outpatient medications on file prior to visit. Objective     /75   Pulse 60   Temp 97.9 °F (36.6 °C) (Tympanic)   Ht 5' 4" (1.626 m)   Wt 65.7 kg (144 lb 12.8 oz)   SpO2 99%   BMI 24.85 kg/m²     Physical Exam  Vitals reviewed. Constitutional:       Comments: Patient is a 49-year-old white female who appears her stated age. She is pleasant, cooperative, and in no distress. HENT:      Head: Normocephalic and atraumatic. Right Ear: Tympanic membrane, ear canal and external ear normal. There is no impacted cerumen. Left Ear: Tympanic membrane, ear canal and external ear normal. There is no impacted cerumen. Mouth/Throat:      Mouth: Mucous membranes are moist.      Pharynx: Oropharynx is clear. No oropharyngeal exudate or posterior oropharyngeal erythema. Eyes:      General: No scleral icterus. Right eye: No discharge. Left eye: No discharge. Conjunctiva/sclera: Conjunctivae normal.      Pupils: Pupils are equal, round, and reactive to light. Neck:      Comments: Thyroid did not appear to be enlarged. I did not detect any neck masses. Cardiovascular:      Rate and Rhythm: Normal rate and regular rhythm. Heart sounds: Normal heart sounds. No murmur heard. No friction rub. No gallop. Pulmonary:      Effort: Pulmonary effort is normal. No respiratory distress. Breath sounds: Normal breath sounds. No stridor. No wheezing, rhonchi or rales. Musculoskeletal:      Cervical back: Neck supple. No tenderness. Lymphadenopathy:      Cervical: No cervical adenopathy.      Extremities: Without cyanosis, clubbing, or edema    Nohemi Mcelroy,

## 2023-07-14 NOTE — ASSESSMENT & PLAN NOTE
Routine labs were recommended for the patient. She was fasting today so I sent her next-door to get the blood work done now.   I am going to recommend annual physical exam.

## 2023-07-15 LAB — HCV AB SER QL: NORMAL

## 2023-07-28 ENCOUNTER — HOSPITAL ENCOUNTER (OUTPATIENT)
Dept: MAMMOGRAPHY | Facility: HOSPITAL | Age: 53
Discharge: HOME | End: 2023-07-28
Attending: FAMILY MEDICINE
Payer: COMMERCIAL

## 2023-07-28 ENCOUNTER — HOSPITAL ENCOUNTER (OUTPATIENT)
Dept: CT IMAGING | Facility: HOSPITAL | Age: 53
Discharge: HOME | End: 2023-07-28
Attending: FAMILY MEDICINE
Payer: COMMERCIAL

## 2023-07-28 VITALS — WEIGHT: 144 LBS | BODY MASS INDEX: 24.59 KG/M2 | HEIGHT: 64 IN

## 2023-07-28 DIAGNOSIS — R22.1 NECK MASS: ICD-10-CM

## 2023-07-28 DIAGNOSIS — Z12.31 ENCOUNTER FOR SCREENING MAMMOGRAM FOR MALIGNANT NEOPLASM OF BREAST: ICD-10-CM

## 2023-07-28 PROCEDURE — 70491 CT SOFT TISSUE NECK W/DYE: CPT

## 2023-07-28 PROCEDURE — 77067 SCR MAMMO BI INCL CAD: CPT

## 2023-07-28 PROCEDURE — 77063 BREAST TOMOSYNTHESIS BI: CPT

## 2023-07-28 PROCEDURE — G1004 CDSM NDSC: HCPCS

## 2023-07-28 RX ADMIN — IOHEXOL 85 ML: 350 INJECTION, SOLUTION INTRAVENOUS at 09:26

## 2024-06-19 ENCOUNTER — VBI (OUTPATIENT)
Dept: ADMINISTRATIVE | Facility: OTHER | Age: 54
End: 2024-06-19

## 2024-06-19 NOTE — TELEPHONE ENCOUNTER
06/19/24 9:06 AM     Chart reviewed for [facility]: Colonoscopy ; nothing is submitted to the patient's insurance at this time.     Amy Mcgill   [facility]

## 2024-07-12 ENCOUNTER — RA CDI HCC (OUTPATIENT)
Dept: OTHER | Facility: HOSPITAL | Age: 54
End: 2024-07-12

## 2024-07-12 PROBLEM — U07.1 COVID-19 VIRUS INFECTION: Status: RESOLVED | Noted: 2020-12-29 | Resolved: 2024-07-12

## 2024-07-12 PROBLEM — Z00.00 ANNUAL PHYSICAL EXAM: Status: RESOLVED | Noted: 2021-06-11 | Resolved: 2024-07-12

## 2024-07-12 PROBLEM — Z02.1 ENCOUNTER FOR PRE-EMPLOYMENT EXAMINATION: Status: RESOLVED | Noted: 2022-10-04 | Resolved: 2024-07-12

## 2024-12-09 ENCOUNTER — VBI (OUTPATIENT)
Dept: ADMINISTRATIVE | Facility: OTHER | Age: 54
End: 2024-12-09

## 2024-12-09 NOTE — TELEPHONE ENCOUNTER
12/09/24 2:50 PM     Chart reviewed for [facility]: Colonoscopy was/were not submitted to the patient's insurance.     Amy Mcgill MA   [facility]

## 2025-06-26 ENCOUNTER — VBI (OUTPATIENT)
Dept: ADMINISTRATIVE | Facility: OTHER | Age: 55
End: 2025-06-26

## 2025-06-26 NOTE — TELEPHONE ENCOUNTER
06/26/25 2:09 PM     Chart reviewed for [facility]: Colonoscopy ; nothing is submitted to the patient's insurance at this time.     Sidney Guerra MA   [facility]

## 2025-07-24 ENCOUNTER — VBI (OUTPATIENT)
Dept: ADMINISTRATIVE | Facility: OTHER | Age: 55
End: 2025-07-24

## 2025-07-24 NOTE — TELEPHONE ENCOUNTER
07/24/25 11:06 AM     Chart reviewed for Pap Smear (HPV) aka Cervical Cancer Screening ; nothing is submitted to the patient's insurance at this time.     Sidney Guerra MA   [facility]